# Patient Record
Sex: FEMALE | Race: WHITE | URBAN - METROPOLITAN AREA
[De-identification: names, ages, dates, MRNs, and addresses within clinical notes are randomized per-mention and may not be internally consistent; named-entity substitution may affect disease eponyms.]

---

## 2019-03-28 ENCOUNTER — OFFICE VISIT (OUTPATIENT)
Dept: UROLOGY | Facility: CLINIC | Age: 84
End: 2019-03-28
Payer: MEDICARE

## 2019-03-28 VITALS — OXYGEN SATURATION: 97 % | HEART RATE: 78 BPM | SYSTOLIC BLOOD PRESSURE: 131 MMHG | DIASTOLIC BLOOD PRESSURE: 78 MMHG

## 2019-03-28 DIAGNOSIS — N39.46 MIXED INCONTINENCE URGE AND STRESS (MALE)(FEMALE): Primary | ICD-10-CM

## 2019-03-28 LAB
ALBUMIN UR-MCNC: NEGATIVE MG/DL
APPEARANCE UR: CLEAR
BILIRUB UR QL STRIP: NEGATIVE
COLOR UR AUTO: YELLOW
GLUCOSE UR STRIP-MCNC: NEGATIVE MG/DL
HGB UR QL STRIP: NEGATIVE
KETONES UR STRIP-MCNC: ABNORMAL MG/DL
LEUKOCYTE ESTERASE UR QL STRIP: NEGATIVE
NITRATE UR QL: NEGATIVE
PH UR STRIP: 7 PH (ref 5–7)
SOURCE: ABNORMAL
SP GR UR STRIP: 1.02 (ref 1–1.03)
UROBILINOGEN UR STRIP-ACNC: 0.2 EU/DL (ref 0.2–1)

## 2019-03-28 PROCEDURE — 99205 OFFICE O/P NEW HI 60 MIN: CPT | Mod: 25 | Performed by: UROLOGY

## 2019-03-28 PROCEDURE — 81003 URINALYSIS AUTO W/O SCOPE: CPT | Performed by: UROLOGY

## 2019-03-28 PROCEDURE — 52000 CYSTOURETHROSCOPY: CPT | Performed by: UROLOGY

## 2019-03-28 RX ORDER — LANOLIN ALCOHOL/MO/W.PET/CERES
3 CREAM (GRAM) TOPICAL
COMMUNITY
Start: 2019-03-25

## 2019-03-28 RX ORDER — POLYETHYLENE GLYCOL 3350 17 G/17G
17 POWDER, FOR SOLUTION ORAL
COMMUNITY
Start: 2018-11-12

## 2019-03-28 RX ORDER — DONEPEZIL HYDROCHLORIDE 10 MG/1
10 TABLET, FILM COATED ORAL
COMMUNITY
Start: 2019-01-10

## 2019-03-28 RX ORDER — NYSTATIN 100000 [USP'U]/G
POWDER TOPICAL
COMMUNITY
Start: 2018-05-04

## 2019-03-28 NOTE — PROGRESS NOTES
Jihan Lee is a 88 year old female seen in consultation for frequency/incont. Consult from Shannan Giraldo  (Presents today with her daughter).    Here on recommendation of her friend who had surgery by us and recommended that the patient have surgery as well.    Has been seeing Trin[facility] Cuadra for several yrs, initially on Trospium which worked well >> insurance changed >> to Myrbetriq which also worked well but insurance has changed again; other office is working on insurance coverage. Has been off Myrbetriq for several weeks and has seen frequency markedly worsen.    Pt with some urinary and fecal urgency and urge incont. Wears several pads per day also at nite.    Underwent pelvic reconstruction and Alex by Dr Jean Baptiste in [place] in 1997 in conjunction with hyster; pt feels as though her incont began with that procedure; no other  intervention.    Has tried Kegel's with limited success.    Reviewed many pages of voiding records with patient.      Past Medical History:   Diagnosis Date     Diverticular disease      FH: colonic polyps      [place] (hard of hearing)      Lipids abnormal      Osteopenia      Postmenopause atrophic vaginitis      Urinary incontinence        Past Surgical History:   Procedure Laterality Date     BLADDER SURGERY       COLONOSCOPY       HYSTERECTOMY       PHACOEMULSIFICATION CLEAR CORNEA WITH STANDARD INTRAOCULAR LENS IMPLANT Left 10/28/2015    Procedure: PHACOEMULSIFICATION CLEAR CORNEA WITH STANDARD INTRAOCULAR LENS IMPLANT;  Surgeon: Chemo Weathers MD;  Location:  [facility]     PHACOEMULSIFICATION CLEAR CORNEA WITH STANDARD INTRAOCULAR LENS IMPLANT Right 11/4/2015    Procedure: PHACOEMULSIFICATION CLEAR CORNEA WITH STANDARD INTRAOCULAR LENS IMPLANT;  Surgeon: Chemo Weathers MD;  Location:  [facility]     tooth exctraction         Social History     Socioeconomic History     Marital status:      Spouse name: Not on file     Number of children: Not on file     Years of education: Not  on file     Highest education level: Not on file   Occupational History     Not on file   Social Needs     Financial resource strain: Not on file     Food insecurity:     Worry: Not on file     Inability: Not on file     Transportation needs:     Medical: Not on file     Non-medical: Not on file   Tobacco Use     Smoking status: Never Smoker   Substance and Sexual Activity     Alcohol use: Yes     Drug use: Not on file     Sexual activity: Not on file   Lifestyle     Physical activity:     Days per week: Not on file     Minutes per session: Not on file     Stress: Not on file   Relationships     Social connections:     Talks on phone: Not on file     Gets together: Not on file     Attends Confucianism service: Not on file     Active member of club or organization: Not on file     Attends meetings of clubs or organizations: Not on file     Relationship status: Not on file     Intimate partner violence:     Fear of current or ex partner: Not on file     Emotionally abused: Not on file     Physically abused: Not on file     Forced sexual activity: Not on file   Other Topics Concern     Not on file   Social History Narrative     Not on file       Current Outpatient Medications   Medication Sig Dispense Refill     ALENDRONATE SODIUM PO Take 35 mg by mouth once a week       Ascorbic Acid (VITAMIN C PO) Take 1,000 mg by mouth daily       calcium carbonate (OS-AMENA 500 MG [place]) 500 MG tablet Take 500 mg by mouth 2 times daily       calcium-magnesium (CALMAG) 500-250 MG TABS Take 2 tablets by mouth daily       Coenzyme Q10 (CO Q 10 PO) Take 100 mg by mouth daily       Cyanocobalamin (VITAMIN B 12 PO) Take 500 mcg by mouth daily       MAGNESIUM OXIDE PO Take 200 mg by mouth daily       Potassium (POTASSIMIN PO) Take 75 mg by mouth       VITAMIN E NATURAL PO Take 200 Units by mouth daily         Physical Exam:    GENL: NAD.    ABD: Soft, non-tender, no masses.    EG: Poorly-estrogenized, no masses.    VAGINA:  Poorly-estrogenized, no masses.    BN HYPERMOBILITY: None.    CYSTOCELE: Grade 1.    APICAL PROLAPSE: Minimal.    RECTOCELE: Minimal.    BIMANUAL: No mass or tenderness.    Cysto:    (Informed consent obtained. Pause for cause performed)   Sterile prep.    17 Fr scope inserted through urethra. Systematic examination w 70 degree lens.   PVR: 25 cc   MUCOSA: Normal without lesion, moderate trabeculation throughout   ORIFICES: Normal location and morphology   CAPACITY: 300 cc; no pain with filling   Scope withdrawn without untoward effect.    (Pt tolerated procedure without difficulty).    Results for orders placed or performed in visit on 03/28/19   UA reflex to Microscopic   Result Value Ref Range    Color Urine Yellow     Appearance Urine Clear     Glucose Urine Negative NEG^Negative mg/dL    Bilirubin Urine Negative NEG^Negative    Ketones Urine Trace (A) NEG^Negative mg/dL    Specific Gravity Urine 1.020 1.003 - 1.035    Blood Urine Negative NEG^Negative    pH Urine 7.0 5.0 - 7.0 pH    Protein Albumin Urine Negative NEG^Negative mg/dL    Urobilinogen Urine 0.2 0.2 - 1.0 EU/dL    Nitrite Urine Negative NEG^Negative    Leukocyte Esterase Urine Negative NEG^Negative    Source Catheterized Urine          IMP:  1. Urinary frequency, large fluids  2. Satisfactory support after prior stabilization      PLAN:  1. Discussed situation with patient in detail.  2. Consider moderation of fluids; discussed in detail; both pt and dtr express understanding  3. OK to restart Myrbetriq once covered by insurance  4. RTC to us only prn  5. Set realistic expectations  6. 60 minutes spent with patient, more than 50% in counseling and coordination of care for frequency, which did not include time spent for the procedure.